# Patient Record
Sex: FEMALE | Race: WHITE | NOT HISPANIC OR LATINO | URBAN - METROPOLITAN AREA
[De-identification: names, ages, dates, MRNs, and addresses within clinical notes are randomized per-mention and may not be internally consistent; named-entity substitution may affect disease eponyms.]

---

## 2018-06-28 ENCOUNTER — APPOINTMENT (OUTPATIENT)
Age: 78
Setting detail: DERMATOLOGY
End: 2018-06-29

## 2018-06-28 VITALS — SYSTOLIC BLOOD PRESSURE: 151 MMHG | DIASTOLIC BLOOD PRESSURE: 76 MMHG | HEART RATE: 68 BPM

## 2018-06-28 VITALS
HEART RATE: 70 BPM | WEIGHT: 175 LBS | SYSTOLIC BLOOD PRESSURE: 173 MMHG | HEIGHT: 63 IN | DIASTOLIC BLOOD PRESSURE: 85 MMHG

## 2018-06-28 PROBLEM — C44.391 OTHER SPECIFIED MALIGNANT NEOPLASM OF SKIN OF NOSE: Status: ACTIVE | Noted: 2018-06-28

## 2018-06-28 PROCEDURE — 17312 MOHS ADDL STAGE: CPT

## 2018-06-28 PROCEDURE — OTHER CONSULTATION FOR MOHS SURGERY: OTHER

## 2018-06-28 PROCEDURE — OTHER MOHS SURGERY: OTHER

## 2018-06-28 PROCEDURE — OTHER PATIENT SPECIFIC COUNSELING: OTHER

## 2018-06-28 PROCEDURE — 17311 MOHS 1 STAGE H/N/HF/G: CPT

## 2018-06-28 NOTE — PROCEDURE: PATIENT SPECIFIC COUNSELING
Detail Level: Detailed
Explained to patient that this type of tumor may be indicative of potential internal carcinoma, particularly gastrointestinal. Advised that she consult with her primary care physician for a full GI workup to ensure no internal malignancy is present. Will discuss with PCP the risk of Kary Kevan syndrome. Also discussed a delayed full thickness skin graft with the patient. She is agreeable.

## 2018-06-28 NOTE — PROCEDURE: CONSULTATION FOR MOHS SURGERY
Location Indication Override (Is Already Calculated Based On Selected Body Location): Area H
Body Location Override (Optional - Billing Will Still Be Based On Selected Body Map Location If Applicable): nasal tip
Detail Level: Detailed
X Size Of Lesion In Cm (Optional): 0.3
Name Of The Referring Provider For Procedure: Dr. Lorenzana
Incorporate Mauc In Note: Yes

## 2018-07-23 ENCOUNTER — APPOINTMENT (OUTPATIENT)
Age: 78
Setting detail: DERMATOLOGY
End: 2018-07-24

## 2018-07-23 VITALS
SYSTOLIC BLOOD PRESSURE: 178 MMHG | WEIGHT: 175 LBS | HEIGHT: 64 IN | HEART RATE: 68 BPM | DIASTOLIC BLOOD PRESSURE: 86 MMHG

## 2018-07-23 DIAGNOSIS — S31000A OPEN WOUND(S) (MULTIPLE) OF UNSPECIFIED SITE(S), WITHOUT MENTION OF COMPLICATION: ICD-10-CM

## 2018-07-23 PROBLEM — S01.20XA UNSPECIFIED OPEN WOUND OF NOSE, INITIAL ENCOUNTER: Status: ACTIVE | Noted: 2018-07-23

## 2018-07-23 PROCEDURE — OTHER PATIENT SPECIFIC COUNSELING: OTHER

## 2018-07-23 PROCEDURE — OTHER MIPS QUALITY: OTHER

## 2018-07-23 PROCEDURE — OTHER COUNSELING: OTHER

## 2018-07-23 PROCEDURE — 99212 OFFICE O/P EST SF 10 MIN: CPT

## 2018-07-23 ASSESSMENT — LOCATION DETAILED DESCRIPTION DERM: LOCATION DETAILED: NASAL SUPRATIP

## 2018-07-23 ASSESSMENT — LOCATION SIMPLE DESCRIPTION DERM: LOCATION SIMPLE: NOSE

## 2018-07-23 ASSESSMENT — LOCATION ZONE DERM: LOCATION ZONE: NOSE

## 2018-07-23 NOTE — PROCEDURE: PATIENT SPECIFIC COUNSELING
Reassured patient that the wound is well healing. Continue wound care for one to two weeks until fully healed. Advised to continue use biannual skin examinations with Dr. Lorenzana, and follow up with her primary care physician about undergoing a colonoscopy to ensure no internal malignancy.
Detail Level: Detailed